# Patient Record
Sex: MALE | Race: BLACK OR AFRICAN AMERICAN | NOT HISPANIC OR LATINO | Employment: FULL TIME | URBAN - METROPOLITAN AREA
[De-identification: names, ages, dates, MRNs, and addresses within clinical notes are randomized per-mention and may not be internally consistent; named-entity substitution may affect disease eponyms.]

---

## 2018-01-31 DIAGNOSIS — I67.89 OTHER CEREBROVASCULAR DISEASE: Primary | ICD-10-CM

## 2018-02-15 ENCOUNTER — DOCUMENTATION ONLY (OUTPATIENT)
Dept: REHABILITATION | Facility: HOSPITAL | Age: 43
End: 2018-02-15

## 2018-02-15 NOTE — PROGRESS NOTES
No Show Note/Documenation    Patient: Anthony Uribe  Date of Session: 2/15/2018  Diagnosis: No diagnosis found.  MRN: [id number]    Anthony Uribe did not attend his/her scheduled therapy appointment today. He did not call to cancel nor reschedule. This is the 1st appointment that he has not attended. No charges have been posted today.

## 2018-02-20 ENCOUNTER — DOCUMENTATION ONLY (OUTPATIENT)
Dept: REHABILITATION | Facility: HOSPITAL | Age: 43
End: 2018-02-20

## 2018-02-20 NOTE — PROGRESS NOTES
No Show Note/Documentation    Patient: Anthony Uribe  Date of Session: 2/20/2018  Diagnosis: No diagnosis found.  MRN: [id number]    Anthony Uribe did not attend his/her scheduled therapy appointment yesterday. 2/19/2018, did not call to cancel nor reschedule. This is the first appointment that he has not attended. Pt will be contacted to reschedule this initial evaluation.  No charges have been posted today.     JESUS Brewer

## 2018-03-02 DIAGNOSIS — I63.9 CVA (CEREBRAL VASCULAR ACCIDENT): Primary | ICD-10-CM

## 2018-03-06 ENCOUNTER — DOCUMENTATION ONLY (OUTPATIENT)
Dept: REHABILITATION | Facility: HOSPITAL | Age: 43
End: 2018-03-06

## 2018-03-06 NOTE — PROGRESS NOTES
Documentation Only/ No Show    Patient: Anthony Uribe  Date of Session: 03/06/2018  Diagnosis: No diagnosis found.  MRN: [id number]  Anthony Uribe did not attend his/her scheduled therapy EVALUATION appointment today. Anthony Uribe did not call to cancel nor reschedule. This is the 2nd PT EVAL appointment that the patient has not attended.  No charges have been posted today.     Scarlett Stout, PT  03/06/2018

## 2018-04-06 DIAGNOSIS — R53.83 FATIGUE: ICD-10-CM

## 2018-04-06 DIAGNOSIS — R06.83 SNORING: ICD-10-CM

## 2018-04-06 DIAGNOSIS — G47.30 SLEEP APNEA: Primary | ICD-10-CM

## 2018-04-06 DIAGNOSIS — G47.19 EXCESSIVE DAYTIME SLEEPINESS: ICD-10-CM

## 2018-05-29 ENCOUNTER — TELEPHONE (OUTPATIENT)
Dept: SLEEP MEDICINE | Facility: OTHER | Age: 43
End: 2018-05-29

## 2018-06-15 ENCOUNTER — TELEPHONE (OUTPATIENT)
Dept: SLEEP MEDICINE | Facility: CLINIC | Age: 43
End: 2018-06-15

## 2018-07-02 ENCOUNTER — TELEPHONE (OUTPATIENT)
Dept: SLEEP MEDICINE | Facility: CLINIC | Age: 43
End: 2018-07-02

## 2018-07-20 ENCOUNTER — TELEPHONE (OUTPATIENT)
Dept: SLEEP MEDICINE | Facility: CLINIC | Age: 43
End: 2018-07-20

## 2018-07-27 ENCOUNTER — TELEPHONE (OUTPATIENT)
Dept: SLEEP MEDICINE | Facility: CLINIC | Age: 43
End: 2018-07-27

## 2018-08-13 ENCOUNTER — TELEPHONE (OUTPATIENT)
Dept: SLEEP MEDICINE | Facility: CLINIC | Age: 43
End: 2018-08-13

## 2018-09-04 ENCOUNTER — TELEPHONE (OUTPATIENT)
Dept: SLEEP MEDICINE | Facility: OTHER | Age: 43
End: 2018-09-04